# Patient Record
Sex: FEMALE | Race: WHITE | NOT HISPANIC OR LATINO | URBAN - METROPOLITAN AREA
[De-identification: names, ages, dates, MRNs, and addresses within clinical notes are randomized per-mention and may not be internally consistent; named-entity substitution may affect disease eponyms.]

---

## 2017-05-02 ENCOUNTER — APPOINTMENT (RX ONLY)
Dept: URBAN - METROPOLITAN AREA CLINIC 46 | Facility: CLINIC | Age: 77
Setting detail: DERMATOLOGY
End: 2017-05-02

## 2017-05-02 DIAGNOSIS — Z41.9 ENCOUNTER FOR PROCEDURE FOR PURPOSES OTHER THAN REMEDYING HEALTH STATE, UNSPECIFIED: ICD-10-CM

## 2017-05-02 PROCEDURE — ? PRESCRIPTION

## 2017-05-02 PROCEDURE — ? MEDICAL CONSULTATION: LASER RESURFACING

## 2017-05-02 PROCEDURE — ? OTHER

## 2017-05-02 PROCEDURE — ? COSMETIC QUOTE

## 2017-05-02 PROCEDURE — ? TREATMENT REGIMEN

## 2017-05-02 RX ORDER — HYDROQUINONE 4 %
CREAM (GRAM) TOPICAL
Qty: 1 | Refills: 0 | Status: ERX | COMMUNITY
Start: 2017-05-02

## 2017-05-02 RX ADMIN — Medication: at 15:51

## 2017-05-02 ASSESSMENT — LOCATION ZONE DERM: LOCATION ZONE: LIP

## 2017-05-02 ASSESSMENT — LOCATION DETAILED DESCRIPTION DERM
LOCATION DETAILED: RIGHT UPPER CUTANEOUS LIP
LOCATION DETAILED: RIGHT LOWER CUTANEOUS LIP

## 2017-05-02 ASSESSMENT — LOCATION SIMPLE DESCRIPTION DERM: LOCATION SIMPLE: RIGHT LIP

## 2017-05-02 NOTE — PROCEDURE: TREATMENT REGIMEN
Plan: Rx's given patient
Detail Level: Zone
Initiate Treatment: Keflex 500 mg one TID starting one day prior to procedure\\nValium 2mg take one night before , then one 30 min prior to the procedure \\nValtrex 1gr take 1 bid prior to surgery \\nUltram 50 mg 1-2 every 4-6 hours prn pain \\nZofran 8mg take one 30 min before procedure

## 2017-05-02 NOTE — PROCEDURE: OTHER
Detail Level: Zone
Other (Free Text): Preop packet given
Note Text (......Xxx Chief Complaint.): This diagnosis correlates with the

## 2017-05-02 NOTE — PROCEDURE: COSMETIC QUOTE
Juvederm Price Per Syringe: 500
Restylane Price Per Syringe: 500
Xeomin Price Per Unit: 15
Detail Level: Zone
Notice: We have created a more complete Cosmetic Quote plan.  The procedure name is also Cosmetic Quote.  Please review the new plan and hide the Cosmetic Quote plan you do not want to use.
Discount Percentage: 0